# Patient Record
Sex: MALE | Race: WHITE | URBAN - METROPOLITAN AREA
[De-identification: names, ages, dates, MRNs, and addresses within clinical notes are randomized per-mention and may not be internally consistent; named-entity substitution may affect disease eponyms.]

---

## 2023-01-20 ENCOUNTER — OFFICE VISIT (OUTPATIENT)
Dept: SLEEP CENTER | Age: 48
End: 2023-01-20
Attending: INTERNAL MEDICINE
Payer: COMMERCIAL

## 2023-01-20 DIAGNOSIS — R06.83 SNORING: ICD-10-CM

## 2023-01-20 PROCEDURE — 95806 SLEEP STUDY UNATT&RESP EFFT: CPT

## 2024-12-30 ENCOUNTER — APPOINTMENT (OUTPATIENT)
Dept: URBAN - METROPOLITAN AREA CLINIC 317 | Age: 49
Setting detail: DERMATOLOGY
End: 2024-12-30

## 2024-12-30 DIAGNOSIS — L30.9 DERMATITIS, UNSPECIFIED: ICD-10-CM

## 2024-12-30 PROCEDURE — OTHER COUNSELING: OTHER

## 2024-12-30 PROCEDURE — OTHER PHOTO-DOCUMENTATION: OTHER

## 2024-12-30 PROCEDURE — OTHER PRESCRIPTION: OTHER

## 2024-12-30 PROCEDURE — OTHER PRESCRIPTION MEDICATION MANAGEMENT: OTHER

## 2024-12-30 PROCEDURE — OTHER MIPS QUALITY: OTHER

## 2024-12-30 PROCEDURE — 99203 OFFICE O/P NEW LOW 30 MIN: CPT

## 2024-12-30 RX ORDER — HALOBETASOL PROPIONATE 0.5 MG/G
OINTMENT TOPICAL
Qty: 50 | Refills: 0 | Status: ERX | COMMUNITY
Start: 2024-12-30

## 2024-12-30 ASSESSMENT — LOCATION ZONE DERM: LOCATION ZONE: ARM

## 2024-12-30 ASSESSMENT — LOCATION DETAILED DESCRIPTION DERM: LOCATION DETAILED: LEFT VENTRAL DISTAL FOREARM

## 2024-12-30 ASSESSMENT — LOCATION SIMPLE DESCRIPTION DERM: LOCATION SIMPLE: LEFT FOREARM

## 2024-12-30 NOTE — PROCEDURE: PRESCRIPTION MEDICATION MANAGEMENT
Initiate Treatment: Halobetasol 0.05% ointment BID X2 weeks break 2 weeks
Plan: Apply morning and night:\\n1. Halobetasol 0.05% ointment\\n2. Cetaphil/cerave cream\\n3. Aquaphor/ vasaline \\nCan cover per patient asking to keep in place.
Detail Level: Zone
Render In Strict Bullet Format?: No

## 2024-12-30 NOTE — HPI: RASH
What Type Of Note Output Would You Prefer (Optional)?: Bullet Format
How Severe Is Your Rash?: moderate
Is This A New Presentation, Or A Follow-Up?: Rash
Additional History: About 6 months ago patient was bit by an “oak mite” patient has been on ABX cephalexin for 7-10 days, clobetasol cream and Triamcinolone cream. Patient is only currently using Triamcinolone BID. Patient has rash on left arm only. Patient has seen pcp for mprash and was given above medications that helped slightly but keeps coming back.